# Patient Record
Sex: FEMALE | Race: WHITE | Employment: STUDENT | URBAN - METROPOLITAN AREA
[De-identification: names, ages, dates, MRNs, and addresses within clinical notes are randomized per-mention and may not be internally consistent; named-entity substitution may affect disease eponyms.]

---

## 2020-06-23 PROBLEM — M54.6 ACUTE LEFT-SIDED THORACIC BACK PAIN: Status: ACTIVE | Noted: 2020-06-23

## 2020-08-13 ENCOUNTER — ORDER TRANSCRIPTION (OUTPATIENT)
Dept: PHYSICAL THERAPY | Age: 20
End: 2020-08-13

## 2020-08-13 DIAGNOSIS — M54.50 LOW BACK PAIN: Primary | ICD-10-CM

## 2020-12-15 ENCOUNTER — TELEPHONE (OUTPATIENT)
Dept: CARDIOLOGY | Age: 20
End: 2020-12-15

## 2020-12-30 ENCOUNTER — APPOINTMENT (OUTPATIENT)
Dept: CARDIOLOGY | Age: 20
End: 2020-12-30

## 2021-01-18 PROBLEM — M54.6 ACUTE LEFT-SIDED THORACIC BACK PAIN: Status: RESOLVED | Noted: 2020-06-23 | Resolved: 2021-01-18

## 2022-02-21 PROBLEM — M54.12 CERVICAL RADICULITIS: Status: ACTIVE | Noted: 2022-02-21

## 2022-02-21 PROBLEM — M50.30 BULGE OF CERVICAL DISC WITHOUT MYELOPATHY: Status: ACTIVE | Noted: 2022-02-21

## 2022-02-21 PROBLEM — M50.20 BULGE OF CERVICAL DISC WITHOUT MYELOPATHY: Status: ACTIVE | Noted: 2022-02-21

## 2022-02-21 PROBLEM — M50.30 ANNULAR TEAR OF CERVICAL DISC: Status: ACTIVE | Noted: 2022-02-21

## 2022-03-21 ENCOUNTER — LAB ENCOUNTER (OUTPATIENT)
Dept: LAB | Age: 22
End: 2022-03-21
Attending: STUDENT IN AN ORGANIZED HEALTH CARE EDUCATION/TRAINING PROGRAM
Payer: COMMERCIAL

## 2022-03-21 DIAGNOSIS — R10.13 ABDOMINAL PAIN, EPIGASTRIC: ICD-10-CM

## 2022-03-21 PROCEDURE — 87338 HPYLORI STOOL AG IA: CPT

## 2022-03-25 LAB — HELICOBACTER PYLORI AG, FECAL: NEGATIVE

## 2022-03-25 NOTE — PROGRESS NOTES
Great news, H pylori is negative.   Please call with any questions or recurrent symptoms,    Josefina Marie MD

## 2022-05-05 ENCOUNTER — LAB ENCOUNTER (OUTPATIENT)
Dept: LAB | Age: 22
End: 2022-05-05
Payer: COMMERCIAL

## 2022-05-05 DIAGNOSIS — R14.0 BLOATING: ICD-10-CM

## 2022-05-05 DIAGNOSIS — N92.6 IRREGULAR MENSES: ICD-10-CM

## 2022-05-05 LAB
FSH SERPL-ACNC: 5.3 MIU/ML
IGA SERPL-MCNC: 140 MG/DL (ref 70–312)
PROLACTIN SERPL-MCNC: 21.3 NG/ML
TSI SER-ACNC: 1.16 MIU/ML (ref 0.36–3.74)

## 2022-05-05 PROCEDURE — 83001 ASSAY OF GONADOTROPIN (FSH): CPT

## 2022-05-05 PROCEDURE — 84443 ASSAY THYROID STIM HORMONE: CPT

## 2022-05-05 PROCEDURE — 86364 TISS TRNSGLTMNASE EA IG CLAS: CPT

## 2022-05-05 PROCEDURE — 82784 ASSAY IGA/IGD/IGG/IGM EACH: CPT

## 2022-05-05 PROCEDURE — 84146 ASSAY OF PROLACTIN: CPT

## 2022-05-05 PROCEDURE — 36415 COLL VENOUS BLD VENIPUNCTURE: CPT

## 2022-05-06 LAB — TTG IGA SER-ACNC: 0.2 U/ML (ref ?–7)

## 2023-03-23 ENCOUNTER — HOSPITAL ENCOUNTER (OUTPATIENT)
Age: 23
Discharge: HOME | End: 2023-03-23
Payer: COMMERCIAL

## 2023-03-23 ENCOUNTER — APPOINTMENT (OUTPATIENT)
Dept: GENERAL RADIOLOGY | Age: 23
End: 2023-03-23
Attending: NURSE PRACTITIONER
Payer: COMMERCIAL

## 2023-03-23 VITALS
TEMPERATURE: 99 F | DIASTOLIC BLOOD PRESSURE: 85 MMHG | RESPIRATION RATE: 16 BRPM | OXYGEN SATURATION: 100 % | SYSTOLIC BLOOD PRESSURE: 129 MMHG | HEART RATE: 88 BPM

## 2023-03-23 DIAGNOSIS — R05.1 ACUTE COUGH: ICD-10-CM

## 2023-03-23 DIAGNOSIS — L73.8 HOT TUB FOLLICULITIS: Primary | ICD-10-CM

## 2023-03-23 LAB — S PYO AG THROAT QL: NEGATIVE

## 2023-03-23 PROCEDURE — 99203 OFFICE O/P NEW LOW 30 MIN: CPT | Performed by: NURSE PRACTITIONER

## 2023-03-23 PROCEDURE — 71046 X-RAY EXAM CHEST 2 VIEWS: CPT | Performed by: NURSE PRACTITIONER

## 2023-03-23 PROCEDURE — 87880 STREP A ASSAY W/OPTIC: CPT | Performed by: NURSE PRACTITIONER

## 2023-03-23 RX ORDER — METHYLPREDNISOLONE 4 MG/1
TABLET ORAL
Qty: 1 EACH | Refills: 0 | Status: SHIPPED | OUTPATIENT
Start: 2023-03-23

## 2023-03-23 RX ORDER — CIPROFLOXACIN 500 MG/1
500 TABLET, FILM COATED ORAL 2 TIMES DAILY
Qty: 14 TABLET | Refills: 0 | Status: SHIPPED | OUTPATIENT
Start: 2023-03-23 | End: 2023-03-30

## 2023-03-23 NOTE — DISCHARGE INSTRUCTIONS
Take the medications as prescribed. Take an over-the-counter probiotic daily while antibiotics. Take-year-old inhaler as needed for cough/wheezing. OTC antihistamines such as Zyrtec or Claritin daily during the day. OTC Benadryl as needed at night. Over-the-counter Flonase for the nasal congestion. Cool-mist humidifier in the same room. Hot steam showers. Steam inhalations. Please read the attached discharge instructions. Go to your nearest ER for new or worsening symptoms.

## 2023-03-23 NOTE — ED INITIAL ASSESSMENT (HPI)
x10 day Pt c/o URI/cough which remain, Denies fevers    x3 days Pt c/o general red body rash, +itching.

## 2023-09-01 ENCOUNTER — HOSPITAL ENCOUNTER (OUTPATIENT)
Age: 23
Discharge: HOME | End: 2023-09-01
Payer: COMMERCIAL

## 2023-09-01 VITALS
WEIGHT: 140 LBS | BODY MASS INDEX: 23.9 KG/M2 | RESPIRATION RATE: 16 BRPM | HEIGHT: 64 IN | HEART RATE: 74 BPM | OXYGEN SATURATION: 100 % | TEMPERATURE: 98 F | DIASTOLIC BLOOD PRESSURE: 71 MMHG | SYSTOLIC BLOOD PRESSURE: 124 MMHG
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
## 2023-09-01 DIAGNOSIS — L73.9 FOLLICULITIS: Primary | ICD-10-CM

## 2023-09-01 LAB — B-HCG UR QL: NEGATIVE

## 2023-09-01 RX ORDER — DEXAMETHASONE 4 MG/1
12 TABLET ORAL ONCE
Status: COMPLETED | OUTPATIENT
Start: 2023-09-01 | End: 2023-09-01

## 2023-09-01 RX ORDER — IBUPROFEN 600 MG/1
600 TABLET ORAL ONCE
Status: COMPLETED | OUTPATIENT
Start: 2023-09-01 | End: 2023-09-01

## 2023-09-01 RX ORDER — METHYLPREDNISOLONE 4 MG/1
TABLET ORAL
Qty: 1 EACH | Refills: 0 | Status: SHIPPED | OUTPATIENT
Start: 2023-09-01

## 2023-09-01 RX ORDER — CHLORHEXIDINE GLUCONATE 4 G/100ML
30 SOLUTION TOPICAL
Qty: 1 EACH | Refills: 0 | Status: SHIPPED | OUTPATIENT
Start: 2023-09-01

## 2023-09-01 RX ORDER — CEFDINIR 300 MG/1
300 CAPSULE ORAL 2 TIMES DAILY
Qty: 20 CAPSULE | Refills: 0 | Status: SHIPPED | OUTPATIENT
Start: 2023-09-01 | End: 2023-09-11

## 2023-12-04 ENCOUNTER — HOSPITAL ENCOUNTER (OUTPATIENT)
Age: 23
Discharge: HOME | End: 2023-12-04
Payer: COMMERCIAL

## 2023-12-04 VITALS
SYSTOLIC BLOOD PRESSURE: 114 MMHG | HEIGHT: 64 IN | HEART RATE: 75 BPM | TEMPERATURE: 98 F | WEIGHT: 140 LBS | OXYGEN SATURATION: 100 % | BODY MASS INDEX: 23.9 KG/M2 | RESPIRATION RATE: 16 BRPM | DIASTOLIC BLOOD PRESSURE: 68 MMHG

## 2023-12-04 DIAGNOSIS — M54.2 CHRONIC NECK PAIN: Primary | ICD-10-CM

## 2023-12-04 DIAGNOSIS — G89.29 CHRONIC NECK PAIN: Primary | ICD-10-CM

## 2023-12-04 PROCEDURE — 99213 OFFICE O/P EST LOW 20 MIN: CPT | Performed by: NURSE PRACTITIONER

## 2023-12-04 RX ORDER — DOXYCYCLINE HYCLATE 100 MG
100 TABLET ORAL 2 TIMES DAILY
COMMUNITY
Start: 2023-11-30 | End: 2023-12-07

## 2023-12-04 RX ORDER — ALPRAZOLAM 0.5 MG/1
1 TABLET ORAL NIGHTLY PRN
COMMUNITY

## 2023-12-04 NOTE — DISCHARGE INSTRUCTIONS
Physical therapy exercises as directed by your physical therapist  Rest.  Warm moist heat to neck. May alternate with cold pack    Take 200mg of Ibuprofen with 500mg of Tylenol (Acetaminophen) every 4-6 hours as needed for pain  You may take up to a maximum of 600mg or Ibuprofen along with 2 extra-strength Tylenol (Acetaminophen) every 6-8 hours as needed for pain. Seek immediate medical attention for worsening symptoms.

## 2023-12-04 NOTE — ED INITIAL ASSESSMENT (HPI)
Patient reports she has had some chronic neck issues, stating she has had a flare up of pain for past 2 weeks. States she has some issues with C5. 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 States her employer instructed her to come to urgent care to get documentation to return to work. Taking ibuprofen as needed. May need to see occupational health doctor for clearance, not workmen's comp, no new injury reported.

## 2025-01-23 ENCOUNTER — HOSPITAL ENCOUNTER (OUTPATIENT)
Age: 25
Discharge: HOME | End: 2025-01-23

## 2025-01-23 VITALS
OXYGEN SATURATION: 100 % | HEART RATE: 82 BPM | TEMPERATURE: 98 F | DIASTOLIC BLOOD PRESSURE: 78 MMHG | SYSTOLIC BLOOD PRESSURE: 125 MMHG | RESPIRATION RATE: 17 BRPM

## 2025-01-23 DIAGNOSIS — J34.0 CELLULITIS OF EXTERNAL NOSE: Primary | ICD-10-CM

## 2025-01-23 PROCEDURE — 99213 OFFICE O/P EST LOW 20 MIN: CPT | Performed by: NURSE PRACTITIONER

## 2025-01-23 RX ORDER — MUPIROCIN 20 MG/G
1 OINTMENT TOPICAL 2 TIMES DAILY
Qty: 22 G | Refills: 0 | Status: SHIPPED | OUTPATIENT
Start: 2025-01-23

## 2025-01-23 NOTE — ED PROVIDER NOTES
History     Chief Complaint   Patient presents with    Nose Problem    Cellulitis       Subjective:   HPI    Ester Sanchez, 24 year old female with notable medical history of n/a who presents with nose redness. Patient reports s/s started 3-days ago with tenderness. Patient denies known precipitating events, but did recently have a facial and whole-face threading. Denies fever, chills, discharge.      Patient Active Problem List   Diagnosis    Routine infant or child health check    Umbilical hernia    Allergic rhinitis due to allergen    Acne scarring    Acne vulgaris    Contact dermatitis and eczema    Deliberate self-cutting    Bulge of cervical disc without myelopathy    Annular tear of cervical disc    Cervical radiculitis      Objective:   Past Medical History:    Abdominal distention    Abdominal hernia    Abdominal pain    ADD (attention deficit disorder)    ADHD (attention deficit hyperactivity disorder)    Allergic rhinitis    Anxiety    Asthma (HCC)    Back pain    Belching    Bloating    Body piercing    Change in hair    Decorative tattoo    Depression    Extrinsic asthma, unspecified    Fatigue    Food intolerance    History of depression    History of eating disorder    History of mental disorder    Mouth sores    Pneumonia    Bacterial - hospitalized and collapsed lung    Rash    Stress              Past Surgical History:   Procedure Laterality Date    Hernia surgery      Other  2016    [place]ior    Other surgical history  4/10/2013    Repair of umbilical hernia performed by Dr. 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)   Tobacco Use    Smoking status: Never     Passive exposure: Never    Smokeless tobacco: Former   Vaping Use    Vaping status: Some Days    Substance and Sexual Activity    Alcohol use: Yes     Alcohol/week: 5.0 standard drinks of alcohol     Types: 3 Glasses of wine, 2 Shots of liquor per week     Comment: few times a week    Drug use: Yes     Types: Cannabis     Comment: daily    Sexual activity: Yes     Partners: Female   Other Topics Concern    Exercise Yes    Seat Belt Yes    Special Diet Yes     Comment: vegetarian     Service No              Medications Ordered Prior to Encounter[1]      Constitutional and vital signs reviewed.      All other systems reviewed and negative except as noted above.    I have reviewed the family history, social history, allergies, and outpatient medications.     History reviewed from EMR: Encounters, problem list, allergies, medications      Physical Exam     ED Triage Vitals [01/23/25 1412]   /78   Pulse 82   Resp 17   Temp 98.2 °F (36.8 °C)   Temp src Oral   SpO2 100 %   O2 Device None (Room air)       Current:/78   Pulse 82   Temp 98.2 °F (36.8 °C) (Oral)   Resp 17   LMP 01/02/2025   SpO2 100%       Physical Exam  Vitals and nursing note reviewed.   Constitutional:       General: She is not in acute distress.     Appearance: Normal appearance. She is normal weight. She is not ill-appearing or toxic-appearing.   HENT:      Head: Normocephalic and atraumatic.      Right Ear: External ear normal.      Left Ear: External ear normal.      Nose: Nose normal.        Comments: Approx 1.2cm diameter area of erythema and mild induration to Left tip of nose. No area of fluctuance.      Mouth/Throat:      Mouth: Mucous membranes are moist.   Eyes:      Extraocular Movements: Extraocular movements intact.      Conjunctiva/sclera: Conjunctivae normal.      Pupils: Pupils are equal, round, and reactive to light.   Cardiovascular:      Rate and Rhythm: Normal rate.      Pulses: Normal pulses.   Pulmonary:      Effort: Pulmonary effort is normal. No respiratory distress.   Musculoskeletal:          General: No swelling, tenderness or signs of injury. Normal range of motion.      Cervical back: Normal range of motion and neck supple.   Skin:     General: Skin is warm and dry.      Capillary Refill: Capillary refill takes less than 2 seconds.      Coloration: Skin is not jaundiced.   Neurological:      General: No focal deficit present.      Mental Status: She is alert and oriented to person, place, and time. Mental status is at baseline.   Psychiatric:         Mood and Affect: Mood normal.         Behavior: Behavior normal.         Thought Content: Thought content normal.         Judgment: Judgment normal.            ED Course     Labs Reviewed - No data to display  No orders to display       Vitals:    01/23/25 1412   BP: 125/78   Pulse: 82   Resp: 17   Temp: 98.2 °F (36.8 °C)   TempSrc: Oral   SpO2: 100%            [facility]        Ester Sanchez, 24 year old female with medical history as noted above who presents with nasal erythema and swelling   - Patient in NAD, VSS   - cellulitis vs abscess vs other   - Exam c/w developing superficial infection; will treat with topical antibiotics   - Supportive care and infection control measures discussed   - Follow up with primary care provider as needed         ** See ED course below for additional information on care provided / interventions / notable events throughout patient's encounter.    ** See Home Care Instructions below for care measures to trial as applicable.         ** I have independently reviewed the radiology images, clinical lab results, and ECG tracings as described above (if applicable)    ** Concerning co-morbidities possibly affecting complaint / care: n/a    ** See disposition & plan section below for home care instructions - if applicable        Medical Decision Making  Risk  OTC drugs.  Prescription drug management.        Disposition and Plan     Disposition:  Discharge  1/23/2025  2:33 pm    Clinical Impression:  1. Cellulitis of external nose             Home care instructions:     - Clean and dry gently   - Apply thin layer antibiotic ointment twice daily for 1-week   - Redness and swelling should improve over the next few days    General Health Maintenance   - Overall goal: Improve body functioning through improved cellular health by decrease inflammation   - Move more (7k steps a day improves cardiovascular health - approx 1.5hrs of cumulative walking per day)   - Sleep better (consistent bedtime, sleep mask, magnesium glycinate)   - Eat \"real\" food (avoid high processed foods such as: snack foods, margarine, frozen foods, most breads)   - Avoid sugar which is very inflammatory and interferes with cellular function   - Avoid excessive dairy (milk, cheese, etc. [Yogurt is ok])   - Early exposure to daylight   - Drink more water (preferably filtered)   - Prioritize protein with every meal   - Daily multivitamins and extra vitamin D (2000IU) daily, year round   - Yearly health monitoring by your primary care provider       Follow-up:  Talha Adams MD  [address] [address] [facility]  [address] [place] [id number] [phone]      As needed          Medications Prescribed:  Current Discharge Medication List        START taking these medications    Details   mupirocin 2 % External Ointment Apply 1 Application topically 2 (two) times daily.  Qty: 22 g, Refills: 0    Comments: OK to substitute volume based on availability               Conrad Wall, DNP, APRN, AGACNP-BC, FNP-C, CNL  Adult-Gerontology Acute Care & Family Nurse Practitioner  [facility]      The above patient (and/or guardian) was made aware that an appropriate evaluation has been performed, and that no additional testing is required at this time. In my medical judgment, there is currently no evidence of an immediate life-threatening or surgical condition, therefore discharge is indicated at this time. The patient (and/or guardian) was advised that a small risk still exists  that a serious condition could develop. The patient was instructed to arrange close follow-up with their primary care provider (or the referral provider given today). The patient received written and verbal instructions regarding their condition / concerns, demonstrated understanding, and is agreement with the outpatient treatment plan.              [1]   No current facility-administered medications on file prior to encounter.     Current Outpatient Medications on File Prior to Encounter   Medication Sig Dispense Refill    ALPRAZolam 0.5 MG Oral Tab Take 1 tablet (0.5 mg total) by mouth nightly as needed.      sertraline 50 MG Oral Tab Take 1.5 tablets (75 mg total) by mouth daily.      Lisdexamfetamine Dimesylate 30 MG Oral Cap Take 1 capsule (30 mg total) by mouth every morning.      amphetamine-dextroamphetamine 10 MG Oral Tab Take 1 tablet (10 mg total) by mouth daily.      chlorhexidine 4 % External Liquid Apply 30 mL topically daily as needed. (Patient not taking: Reported on 1/23/2025) 1 each 0    Albuterol Sulfate  (90 Base) MCG/ACT Inhalation Aero Soln Inhale 2 puffs into the lungs every 4 (four) hours as needed. (Patient not taking: Reported on 1/23/2025)      EPINEPHrine 0.3 MG/0.3ML Injection Solution Auto-injector Inject 0.3 mL (1 each total) into the muscle as needed. (Patient not taking: Reported on 1/23/2025) 1 each 0

## 2025-01-23 NOTE — DISCHARGE INSTRUCTIONS
- Clean and dry gently   - Apply thin layer antibiotic ointment twice daily for 1-week   - Redness and swelling should improve over the next few days    General Health Maintenance   - Overall goal: Improve body functioning through improved cellular health by decrease inflammation   - Move more (7k steps a day improves cardiovascular health - approx 1.5hrs of cumulative walking per day)   - Sleep better (consistent bedtime, sleep mask, magnesium glycinate)   - Eat \"real\" food (avoid high processed foods such as: snack foods, margarine, frozen foods, most breads)   - Avoid sugar which is very inflammatory and interferes with cellular function   - Avoid excessive dairy (milk, cheese, etc. [Yogurt is ok])   - Early exposure to daylight   - Drink more water (preferably filtered)   - Prioritize protein with every meal   - Daily multivitamins and extra vitamin D (2000IU) daily, year round   - Yearly health monitoring by your primary care provider

## 2025-05-05 ENCOUNTER — HOSPITAL ENCOUNTER (OUTPATIENT)
Age: 25
Discharge: HOME | End: 2025-05-05
Payer: COMMERCIAL

## 2025-05-05 ENCOUNTER — APPOINTMENT (OUTPATIENT)
Dept: GENERAL RADIOLOGY | Age: 25
End: 2025-05-05
Attending: NURSE PRACTITIONER
Payer: COMMERCIAL

## 2025-05-05 VITALS
SYSTOLIC BLOOD PRESSURE: 128 MMHG | HEART RATE: 62 BPM | OXYGEN SATURATION: 99 % | RESPIRATION RATE: 18 BRPM | TEMPERATURE: 98 F | DIASTOLIC BLOOD PRESSURE: 72 MMHG

## 2025-05-05 DIAGNOSIS — S69.90XA FINGER INJURY: Primary | ICD-10-CM

## 2025-05-05 PROCEDURE — 73140 X-RAY EXAM OF FINGER(S): CPT | Performed by: NURSE PRACTITIONER

## 2025-05-05 PROCEDURE — 99213 OFFICE O/P EST LOW 20 MIN: CPT | Performed by: NURSE PRACTITIONER

## 2025-05-05 PROCEDURE — A4570 SPLINT: HCPCS | Performed by: NURSE PRACTITIONER

## 2025-05-05 RX ORDER — GUANFACINE 2 MG/1
2 TABLET, EXTENDED RELEASE ORAL NIGHTLY
COMMUNITY
Start: 2025-03-17
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
## 2025-05-05 NOTE — DISCHARGE INSTRUCTIONS
Wear finger splint for 1-2 weeks.  If pain and limits to range of motion persists follow up with hand in one week.
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
## 2025-05-11 ENCOUNTER — HOSPITAL ENCOUNTER (OUTPATIENT)
Age: 25
Discharge: HOME | End: 2025-05-11
Payer: COMMERCIAL

## 2025-05-11 VITALS
TEMPERATURE: 98 F | HEART RATE: 86 BPM | DIASTOLIC BLOOD PRESSURE: 78 MMHG | RESPIRATION RATE: 16 BRPM | OXYGEN SATURATION: 100 % | SYSTOLIC BLOOD PRESSURE: 127 MMHG

## 2025-05-11 DIAGNOSIS — J02.9 THROAT SORENESS: Primary | ICD-10-CM

## 2025-05-11 DIAGNOSIS — R09.81 SINUS CONGESTION: ICD-10-CM

## 2025-05-11 LAB
POCT INFLUENZA A: NEGATIVE
POCT INFLUENZA B: NEGATIVE
S PYO AG THROAT QL: NEGATIVE

## 2025-05-11 PROCEDURE — 87880 STREP A ASSAY W/OPTIC: CPT | Performed by: PHYSICIAN ASSISTANT

## 2025-05-11 PROCEDURE — 99214 OFFICE O/P EST MOD 30 MIN: CPT | Performed by: PHYSICIAN ASSISTANT

## 2025-05-11 PROCEDURE — 87502 INFLUENZA DNA AMP PROBE: CPT | Performed by: PHYSICIAN ASSISTANT

## 2025-05-11 RX ORDER — CEFDINIR 300 MG/1
300 CAPSULE ORAL 2 TIMES DAILY
Qty: 20 CAPSULE | Refills: 0 | Status: SHIPPED | OUTPATIENT
Start: 2025-05-11

## 2025-05-11 RX ORDER — PREDNISONE 20 MG/1
40 TABLET ORAL DAILY
Qty: 6 TABLET | Refills: 0 | Status: SHIPPED | OUTPATIENT
Start: 2025-05-11 | End: 2025-05-14

## 2025-05-11 RX ORDER — DEXAMETHASONE 4 MG/1
4 TABLET ORAL ONCE
Status: COMPLETED | OUTPATIENT
Start: 2025-05-11 | End: 2025-05-11

## 2025-05-11 NOTE — DISCHARGE INSTRUCTIONS
Please return to the ER/clinic if symptoms worsen. Follow-up with your PCP in 24-48 hours as needed.    Recommend taking an over the counter antihistamine daily: IE zyrtec/claritin.  Sleep more upright. Use chloraseptic spray to help stop the cough trigger reflex.  Push fluids and gargle with warm saline rinses.   The Decadron will work in your system the next several days to open up your sinus cavities.  You may start the additional prednisone on day 2 or 3 if symptoms persist.  Follow-up with your primary care physician for further evaluation and treatment.

## 2025-05-11 NOTE — ED PROVIDER NOTES
Patient Seen in: [facility]mediate [facility]      History     Chief Complaint   Patient presents with    Headache    Sore Throat    Fatigue    Post Nasal Drip     Stated Complaint: sore throat, pressure, headache, post nasal drip    Subjective:   HPI    24-year-old female here with complaint of a weeklong history of sinus congestion with the past 3 days with sore throat postnasal drip and some mild headache.  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  Patient denies chest pain, shortness of breath, cough, abdominal pain, nausea, vomiting or diarrhea.  Patient is tolerating p.o. speaking full sentences.  Afebrile.             Objective:     Past Medical History:    Abdominal distention    Abdominal hernia    Abdominal pain    ADD (attention deficit disorder)    ADHD (attention deficit hyperactivity disorder)    Allergic rhinitis    Anxiety    Asthma (HCC)    Back pain    Belching    Bloating    Body piercing    Change in hair    Decorative tattoo    Depression    Extrinsic asthma, unspecified    Fatigue    Food intolerance    History of depression    History of eating disorder    History of mental disorder    Mouth sores    Pneumonia    Bacterial - hospitalized and collapsed lung    Rash    Stress            The patient's medication list, past medical history and social history elements  as listed in today's nurse's notes are reviewed and agree.   The patient's family history is reviewed and is noncontributory to the presenting problem, except as indicated as above.     Past Surgical History:   Procedure Laterality Date    Hernia surgery      Other  2016 wisdom    Other surgical history  4/10/2013    Repair of umbilical hernia performed by Dr. 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)   Tobacco Use    Smoking status: Never     Passive exposure: Never    Smokeless tobacco: Former   Vaping Use    Vaping status: Some Days   Substance and Sexual Activity    Alcohol use: Yes     Alcohol/week: 5.0 standard drinks of alcohol     Types: 3 Glasses of wine, 2 Shots of liquor per week     Comment: few times a week    Drug use: Yes     Types: Cannabis     Comment: daily    Sexual activity: Yes     Partners: Female   Other Topics Concern    Exercise Yes    Seat Belt Yes    Special Diet Yes     Comment: vegetarian     Service No              Review of Systems    Positive for stated complaint: sore throat, pressure, headache, post nasal drip  Other systems are as noted in HPI.  Constitutional and vital signs reviewed.      All other systems reviewed and negative except as noted above.                  Physical Exam     ED Triage Vitals [05/11/25 1318]   /78   Pulse 86   Resp 16   Temp 98.4 °F (36.9 °C)   Temp src Oral   SpO2 100 %   O2 Device None (Room air)       Current Vitals:   Vital Signs  BP: 127/78  Pulse: 86  Resp: 16  Temp: 98.4 °F (36.9 °C)  Temp src: Oral    Oxygen Therapy  SpO2: 100 %  O2 Device: None (Room air)        Physical Exam  Vitals and nursing note reviewed.   Constitutional:       Appearance: She is well-developed.   HENT:      Head: Normocephalic and atraumatic.      Jaw: There is normal jaw occlusion.      Right Ear: External ear normal.      Left Ear: External ear normal.      Nose: Congestion and rhinorrhea present. Rhinorrhea is clear.      Mouth/Throat:      Lips: Pink.      Mouth: Mucous membranes are moist.      Pharynx: Posterior oropharyngeal erythema and postnasal drip present.   Eyes:      Conjunctiva/sclera: Conjunctivae normal.      Pupils: Pupils are equal, round, and reactive to light.   Cardiovascular:      Rate and Rhythm: Normal rate and regular rhythm.      Heart sounds: Normal heart sounds.    Pulmonary:      Effort: Pulmonary effort is normal.      Breath sounds: Normal breath sounds.   Musculoskeletal:      Cervical back: Normal range of motion and neck supple.   Skin:     General: Skin is warm.      Capillary Refill: Capillary refill takes less than 2 seconds.   Neurological:      Mental Status: She is alert and oriented to person, place, and time.   Psychiatric:         Behavior: Behavior normal.         Thought Content: Thought content normal.         Judgment: Judgment normal.                       ED Course     Labs Reviewed   POCT RAPID STREP - Normal   POCT FLU TEST - Normal    Narrative:     This assay is a rapid molecular in vitro test utilizing nucleic acid amplification of influenza A and B viral RNA.                                   MDM       Clinical Impression: throat soreness/sinus congestion  Course of Treatment:   Recommend taking an over the counter antihistamine daily: IE zyrtec/claritin.  Sleep more upright. Use chloraseptic spray to help stop the cough trigger reflex.  Push fluids and gargle with warm saline rinses.   The Decadron will work in your system the next several days to open up your sinus cavities.  You may start the additional prednisone on day 2 or 3 if symptoms persist.  Follow-up with your primary care physician for further evaluation and treatment.    The patient is encouraged to return if any concerning symptoms arise. Additional verbal discharge instructions are given and discussed. Discharge medications are discussed. The patient is in good condition throughout the visit today and remains so upon discharge. I discuss the plan of care with the patient, who expresses understanding. All questions and concerns are addressed to the patient's satisfaction prior to discharge today.  Previous conversations with PCP and charts were reviewed.            Disposition and Plan     Clinical Impression:  1. Throat soreness    2. Sinus congestion          Disposition:  Discharge  5/11/2025  2:06 pm    Follow-up:  Talha Adams MD  [id number] [address] [address] [phone]                Medications Prescribed:  Current Discharge Medication List        START taking these medications    Details   predniSONE 20 MG Oral Tab Take 2 tablets (40 mg total) by mouth daily for 3 days. Tart on day 2-3 if symptoms persist  Qty: 6 tablet, Refills: 0      cefdinir 300 MG Oral Cap Take 1 capsule (300 mg total) by mouth 2 (two) times daily.  Qty: 20 capsule, Refills: 0             Supplementary Documentation:

## (undated) NOTE — LETTER
Date & Time: 12/4/2023, 11:01 AM  Patient: [facility]  Encounter Provider(s):    SHON Carmichael       To Whom It May Concern:    Beau Mcbride was seen and treated in our department on 12/4/2023. She  may return to work without restrictions.  .    If you have any questions or concerns, please do not hesitate to call.        _____________________________  Physician/[facility] Signature